# Patient Record
Sex: MALE | Race: WHITE | NOT HISPANIC OR LATINO | Employment: OTHER | URBAN - METROPOLITAN AREA
[De-identification: names, ages, dates, MRNs, and addresses within clinical notes are randomized per-mention and may not be internally consistent; named-entity substitution may affect disease eponyms.]

---

## 2021-01-20 ENCOUNTER — OFFICE VISIT (OUTPATIENT)
Dept: FAMILY MEDICINE CLINIC | Facility: HOSPITAL | Age: 31
End: 2021-01-20
Payer: OTHER GOVERNMENT

## 2021-01-20 VITALS
HEART RATE: 64 BPM | DIASTOLIC BLOOD PRESSURE: 84 MMHG | WEIGHT: 240.3 LBS | SYSTOLIC BLOOD PRESSURE: 122 MMHG | HEIGHT: 72 IN | BODY MASS INDEX: 32.55 KG/M2 | TEMPERATURE: 97 F

## 2021-01-20 DIAGNOSIS — G89.29 CHRONIC BACK PAIN, UNSPECIFIED BACK LOCATION, UNSPECIFIED BACK PAIN LATERALITY: Primary | ICD-10-CM

## 2021-01-20 DIAGNOSIS — M54.9 CHRONIC BACK PAIN, UNSPECIFIED BACK LOCATION, UNSPECIFIED BACK PAIN LATERALITY: Primary | ICD-10-CM

## 2021-01-20 DIAGNOSIS — F32.1 CURRENT MODERATE EPISODE OF MAJOR DEPRESSIVE DISORDER WITHOUT PRIOR EPISODE (HCC): ICD-10-CM

## 2021-01-20 DIAGNOSIS — M54.10 RADICULOPATHY, UNSPECIFIED SPINAL REGION: ICD-10-CM

## 2021-01-20 PROCEDURE — 99203 OFFICE O/P NEW LOW 30 MIN: CPT | Performed by: FAMILY MEDICINE

## 2021-01-20 RX ORDER — GABAPENTIN 300 MG/1
600 CAPSULE ORAL
Qty: 60 CAPSULE | Refills: 1 | Status: SHIPPED | OUTPATIENT
Start: 2021-01-20 | End: 2021-06-16 | Stop reason: SDUPTHER

## 2021-01-20 RX ORDER — ESCITALOPRAM OXALATE 10 MG/1
10 TABLET ORAL DAILY
Qty: 30 TABLET | Refills: 1 | Status: SHIPPED | OUTPATIENT
Start: 2021-01-20 | End: 2021-07-12

## 2021-01-20 RX ORDER — IBUPROFEN 600 MG/1
600 TABLET ORAL DAILY
Qty: 180 TABLET | Refills: 1 | Status: SHIPPED | OUTPATIENT
Start: 2021-01-20

## 2021-01-20 RX ORDER — IBUPROFEN 600 MG/1
TABLET ORAL DAILY
COMMUNITY
End: 2021-01-20 | Stop reason: SDUPTHER

## 2021-01-20 RX ORDER — GABAPENTIN 300 MG/1
300 CAPSULE ORAL DAILY
COMMUNITY
End: 2021-01-20 | Stop reason: SDUPTHER

## 2021-01-20 NOTE — PROGRESS NOTES
Assessment/Plan:      Problem List Items Addressed This Visit     None      Visit Diagnoses     Chronic back pain, unspecified back location, unspecified back pain laterality    -  Primary    Relevant Medications    gabapentin (NEURONTIN) 300 mg capsule    ibuprofen (MOTRIN) 600 mg tablet    Current moderate episode of major depressive disorder without prior episode (HCC)        Relevant Medications    escitalopram (LEXAPRO) 10 mg tablet    Radiculopathy, unspecified spinal region        Relevant Medications    gabapentin (NEURONTIN) 300 mg capsule    ibuprofen (MOTRIN) 600 mg tablet           Plan/Discussion:    Chronic back pain  Injury was in the thoracic back  About 3 year ago  Agree with gabapentin  Will try increase dose of 600 mg nightly  Monitor se/ar  He will call if there is any issue  He will be following up with apt with the [facility] regarding this  May continue with prn motrin 600 mg as needed  MDD  With some PTSD associated  Discussed treatment options  Will start lexapro 10 mg daily  Discussed se/ar  Will fu in 4 weeks  No si/hi  He will cotninue with therapy/counselling which he does once a week  He will also continue with scheduling to see psychiatry through the [facility]  Although not wanting any current intervention for his back or spine surgery  Will discuss perhaps pain mgt for spinal cord stimulator if this would be warranted for him  Depression Screening and Follow-up Plan: Patient's depression screening was positive with a PHQ-2 score of 6  Their PHQ-9 score was 25  Patient assessed for underlying major depression  Brief counseling provided and recommend additional follow-up/re-evaluation next office visit  Advised to try to get DOD records  Especially for potential future interventions  Subjective:   Chief Complaint   Patient presents with   Emaline Folds Establish Care    Numbness     Left leg         Patient ID: Tre Downing is a 35 y o  male     Pt here to establish care  35year old, recent medically retired from the [employer]  Was injured in [employer]  He was in a training activity and back was injured while carrying a Ruck sack  Reports damage was done in the thoracic spine  Has been seen in the past through pain mgt and had steroid injections with no benefit  Was seen by spine surgeon who did not recommend surgery  He has been on Gabapentin 300 mg nightly  This has helped him get through the night and has had some control of pain  Has continued to be restless at night due to pain  He has gotten sleepy when taking it during the day  Does have weakness of the arms and legs  Currently with some numbness on the left thigh area  MDD  Diagnosed with this a while back  Stemming from his injury  No prvious depressive sytmpoms prior to his injury  Reviewed phq-9  Has not been on medication but is willing to try  Has been going through counselling/therapy and this has had significant benefit  Currently seeing therapy once a week  prevous si/hi but no current si/hi and has not had them for a while  No longer with any firearms at home  The following portions of the patient's history were reviewed and updated as appropriate: allergies, current medications, past family history, past medical history, past social history, past surgical history and problem list     Review of Systems   Constitutional: Negative  Negative for activity change, appetite change, chills and diaphoresis  HENT: Negative for congestion and dental problem  Respiratory: Negative  Negative for apnea, chest tightness, shortness of breath and wheezing  Cardiovascular: Negative  Negative for chest pain, palpitations and leg swelling  Gastrointestinal: Negative  Negative for abdominal distention, abdominal pain, constipation, diarrhea and nausea  Genitourinary: Negative    Negative for difficulty urinating, dysuria and frequency  Objective:  Vitals:    01/20/21 0821   BP: 122/84   Pulse: 64   Temp: (!) 97 °F (36 1 °C)   Weight: 109 kg (240 lb 4 8 oz)   Height: 6' (1 829 m)     BP Readings from Last 6 Encounters:   01/20/21 122/84      Wt Readings from Last 6 Encounters:   01/20/21 109 kg (240 lb 4 8 oz)             Physical Exam  Vitals signs and nursing note reviewed  Constitutional:       Appearance: Normal appearance  He is normal weight  He is not ill-appearing  HENT:      Head: Normocephalic  Nose: Nose normal    Eyes:      Extraocular Movements: Extraocular movements intact  Pupils: Pupils are equal, round, and reactive to light  Cardiovascular:      Rate and Rhythm: Normal rate and regular rhythm  Pulmonary:      Effort: Pulmonary effort is normal       Breath sounds: Normal breath sounds  Abdominal:      Palpations: Abdomen is soft  Neurological:      Mental Status: He is alert and oriented to person, place, and time  Psychiatric:         Mood and Affect: Mood normal          Behavior: Behavior normal          Thought Content:  Thought content normal          Judgment: Judgment normal

## 2021-03-04 ENCOUNTER — OFFICE VISIT (OUTPATIENT)
Dept: FAMILY MEDICINE CLINIC | Facility: HOSPITAL | Age: 31
End: 2021-03-04
Payer: OTHER GOVERNMENT

## 2021-03-04 ENCOUNTER — HOSPITAL ENCOUNTER (OUTPATIENT)
Dept: RADIOLOGY | Facility: HOSPITAL | Age: 31
Discharge: HOME | End: 2021-03-04
Payer: OTHER GOVERNMENT

## 2021-03-04 VITALS
SYSTOLIC BLOOD PRESSURE: 122 MMHG | BODY MASS INDEX: 31.34 KG/M2 | HEART RATE: 82 BPM | WEIGHT: 231.4 LBS | DIASTOLIC BLOOD PRESSURE: 70 MMHG | TEMPERATURE: 97.8 F | HEIGHT: 72 IN

## 2021-03-04 DIAGNOSIS — M25.531 RIGHT WRIST PAIN: ICD-10-CM

## 2021-03-04 DIAGNOSIS — M79.644 PAIN OF RIGHT THUMB: ICD-10-CM
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
## 2021-03-04 DIAGNOSIS — M54.10 RADICULOPATHY, UNSPECIFIED SPINAL REGION: ICD-10-CM

## 2021-03-04 DIAGNOSIS — F32.1 CURRENT MODERATE EPISODE OF MAJOR DEPRESSIVE DISORDER WITHOUT PRIOR EPISODE (HCC): ICD-10-CM

## 2021-03-04 DIAGNOSIS — M79.644 PAIN OF RIGHT THUMB: Primary | ICD-10-CM

## 2021-03-04 PROCEDURE — 73110 X-RAY EXAM OF WRIST: CPT

## 2021-03-04 PROCEDURE — 73140 X-RAY EXAM OF FINGER(S): CPT

## 2021-03-04 PROCEDURE — 99214 OFFICE O/P EST MOD 30 MIN: CPT | Performed by: FAMILY MEDICINE

## 2021-03-04 NOTE — PROGRESS NOTES
Assessment/Plan:      Problem List Items Addressed This Visit     None      Visit Diagnoses     Pain of right thumb    -  Primary    Relevant Orders    XR thumb right first digit-min 2v    XR wrist 3+ vw right    Fall, initial encounter        Relevant Orders    XR thumb right first digit-min 2v    XR wrist 3+ vw right    Right wrist pain        Relevant Orders    XR wrist 3+ vw right    Current moderate episode of major depressive disorder without prior episode (HCC)        Radiculopathy, unspecified spinal region               Plan/Discussion:    1  Pain in the right thumb/wrist following a fall  Check plain films  Continue with thumb spica splint  2  MDD and low back pain with radiculopathy  Not wanting any medications at this time due to concerns for his sperm morphology  Awaiting retesting prior to reconsidering medications  Lexapro causing delyaed orgasm and erectile dysfunction as well  3  Radiculopathy  Hs been through PT and pain management  Discussed consideration to be evaluated for SCS  They will think about this  Subjective:   Chief Complaint   Patient presents with    Follow-up     4 week        Patient ID: Maranda Faustin is a 28 y o  male  Pt seen for fu  Since the lst visit he stopped both lexapro and neurotin  Noted lexapro causing ED and delayed ejaculation  For fertility workup he was told his sperm morphology is abnormal    Now would like to remain off of medication at least until retesting is done  Also fell the other day  Hurting his thumb and wrist    Pain to touch  The following portions of the patient's history were reviewed and updated as appropriate: allergies, current medications, past family history, past medical history, past social history, past surgical history and problem list     Review of Systems   Constitutional: Negative  Negative for activity change, appetite change, chills and diaphoresis     HENT: Negative for congestion and dental problem  Respiratory: Negative  Negative for apnea, chest tightness, shortness of breath and wheezing  Cardiovascular: Negative  Negative for chest pain, palpitations and leg swelling  Gastrointestinal: Negative  Negative for abdominal distention, abdominal pain, constipation, diarrhea and nausea  Genitourinary: Negative  Negative for difficulty urinating, dysuria and frequency  Objective:  Vitals:    03/04/21 0751   BP: 122/70   Pulse: 82   Temp: 97 8 °F (36 6 °C)   Weight: 105 kg (231 lb 6 4 oz)   Height: 6' (1 829 m)     BP Readings from Last 6 Encounters:   03/04/21 122/70   01/20/21 122/84      Wt Readings from Last 6 Encounters:   03/04/21 105 kg (231 lb 6 4 oz)   01/20/21 109 kg (240 lb 4 8 oz)             Physical Exam  Vitals signs and nursing note reviewed  Constitutional:       General: He is not in acute distress  Appearance: Normal appearance  He is well-developed  He is not ill-appearing  HENT:      Right Ear: External ear normal       Left Ear: Tympanic membrane and external ear normal       Nose: Nose normal  No congestion or rhinorrhea  Mouth/Throat:      Pharynx: No oropharyngeal exudate or posterior oropharyngeal erythema  Eyes:      Extraocular Movements: Extraocular movements intact  Pupils: Pupils are equal, round, and reactive to light  Cardiovascular:      Heart sounds: No murmur  No friction rub  No gallop  Pulmonary:      Effort: Pulmonary effort is normal  No respiratory distress  Breath sounds: No wheezing or rales  Chest:      Chest wall: No tenderness  Abdominal:      General: There is no distension  Palpations: There is no mass  Tenderness: There is no abdominal tenderness  There is no guarding or rebound  Musculoskeletal: Normal range of motion  Right hand: He exhibits tenderness  He exhibits normal range of motion, no bony tenderness, normal capillary refill, no deformity and no laceration  Left hand: Normal         Hands:       Comments: Tenderness on palpation of PIP of right thumb  ttp over the bse of thumb and radial aspect of wrist     Skin:     General: Skin is warm  Capillary Refill: Capillary refill takes less than 2 seconds  Neurological:      Mental Status: He is alert and oriented to person, place, and time     Psychiatric:         Mood and Affect: Mood normal          Behavior: Behavior normal

## 2021-04-06 ENCOUNTER — TELEPHONE (OUTPATIENT)
Dept: OTHER | Facility: OTHER | Age: 31
End: 2021-04-06

## 2021-04-21 ENCOUNTER — OFFICE VISIT (OUTPATIENT)
Dept: FAMILY MEDICINE CLINIC | Facility: HOSPITAL | Age: 31
End: 2021-04-21
Payer: OTHER GOVERNMENT

## 2021-04-21 ENCOUNTER — TELEPHONE (OUTPATIENT)
Dept: FAMILY MEDICINE CLINIC | Facility: HOSPITAL | Age: 31
End: 2021-04-21

## 2021-04-21 VITALS
TEMPERATURE: 97 F | HEART RATE: 78 BPM | HEIGHT: 72 IN | WEIGHT: 226.6 LBS | DIASTOLIC BLOOD PRESSURE: 80 MMHG | BODY MASS INDEX: 30.69 KG/M2 | SYSTOLIC BLOOD PRESSURE: 130 MMHG

## 2021-04-21 DIAGNOSIS — G89.29 CHRONIC BACK PAIN, UNSPECIFIED BACK LOCATION, UNSPECIFIED BACK PAIN LATERALITY: ICD-10-CM

## 2021-04-21 DIAGNOSIS — M54.9 CHRONIC BACK PAIN, UNSPECIFIED BACK LOCATION, UNSPECIFIED BACK PAIN LATERALITY: ICD-10-CM

## 2021-04-21 DIAGNOSIS — F32.1 CURRENT MODERATE EPISODE OF MAJOR DEPRESSIVE DISORDER WITHOUT PRIOR EPISODE (HCC): Primary | ICD-10-CM

## 2021-04-21 DIAGNOSIS — Z11.3 SCREENING FOR STD (SEXUALLY TRANSMITTED DISEASE): Primary | ICD-10-CM

## 2021-04-21 DIAGNOSIS — N46.9 INFERTILITY MALE: ICD-10-CM

## 2021-04-21 PROCEDURE — 99214 OFFICE O/P EST MOD 30 MIN: CPT | Performed by: FAMILY MEDICINE

## 2021-04-21 NOTE — PROGRESS NOTES
Assessment/Plan:      Problem List Items Addressed This Visit     None      Visit Diagnoses     Current moderate episode of major depressive disorder without prior episode ([facility] [address] )    -  Primary    Chronic back pain, unspecified back location, unspecified back pain laterality        Infertility male               Plan/Discussion:  Ongoing infertility treatments  Plan for IUI now  Reports having some abnormality with sperm morphology  Thus off of anymedications such as lexapro or gabapentin  MDD  No si/hi  Continue with therapy  No medications for now  Low back pain  Stable  Off of gabapentin  He will fu once IUI/infertility treatments are done and would like to review medications again  Subjective:   Chief Complaint   Patient presents with    Follow-up     Fertility issues and med check         Patient ID: Ebb Nic is a 28 y o  male  Pt here for fu  Ongoing treatment for infertility  Now will be trying IUI  Reports sperm morphology was off but still possible to have a child  Working with fertility physician  Mdd  Stable  Depression triggered by infertility issues  No si/hi  Ongoing therapy  Was recommended to do acupuncture  Advised regarding this  Low back pain  Off of gabapentin and is worse  Not wanting anything else  Using motrin prn for severe pain  The following portions of the patient's history were reviewed and updated as appropriate: allergies, current medications, past family history, past medical history, past social history, past surgical history and problem list     Review of Systems   Constitutional: Negative  Negative for activity change, appetite change, chills and diaphoresis  HENT: Negative for congestion and dental problem  Respiratory: Negative  Negative for apnea, chest tightness, shortness of breath and wheezing  Cardiovascular: Negative  Negative for chest pain, palpitations and leg swelling  Gastrointestinal: Negative  Negative for abdominal distention, abdominal pain, constipation, diarrhea and nausea  Genitourinary: Negative  Negative for difficulty urinating, dysuria and frequency  Objective:  Vitals:    04/21/21 0818   BP: 130/80   Pulse: 78   Temp: (!) 97 °F (36 1 °C)   Weight: 103 kg (226 lb 9 6 oz)   Height: 6' (1 829 m)     BP Readings from Last 6 Encounters:   04/21/21 130/80   03/04/21 122/70   01/20/21 122/84      Wt Readings from Last 6 Encounters:   04/21/21 103 kg (226 lb 9 6 oz)   03/04/21 105 kg (231 lb 6 4 oz)   01/20/21 109 kg (240 lb 4 8 oz)             Physical Exam  Vitals signs and nursing note reviewed  Neurological:      Mental Status: He is alert     Psychiatric:         Mood and Affect: Mood normal          Behavior: Behavior normal

## 2021-04-21 NOTE — TELEPHONE ENCOUNTER
Patient forgot to ask about std testing at appt today  The fertility doc order r-pr w/ reflex, HIV, Hep B, and HCV  He would like to be tested for any of STD's you could order for him  I advised that insurance may/may not cover this and that it may/may not be able to be added to the labs he had drawn today      pcb

## 2021-04-23 PROBLEM — Z11.3 SCREENING FOR STD (SEXUALLY TRANSMITTED DISEASE): Status: ACTIVE | Noted: 2021-04-23

## 2021-06-14 ENCOUNTER — TELEPHONE (OUTPATIENT)
Dept: FAMILY MEDICINE CLINIC | Facility: HOSPITAL | Age: 31
End: 2021-06-14

## 2021-06-14 NOTE — TELEPHONE ENCOUNTER
Pt dropped off medical records from the time he was medically retired from [employer], they are on [employer]  Also he stopped gabapentin for a time because him and his wife were trying to get pregnant but he is ready to go back on it and kind find the medicine  Could we re rx to [address] in [facility]?

## 2021-07-07 ENCOUNTER — OFFICE VISIT (OUTPATIENT)
Dept: FAMILY MEDICINE CLINIC | Facility: HOSPITAL | Age: 31
End: 2021-07-07
Payer: OTHER GOVERNMENT

## 2021-07-07 VITALS
TEMPERATURE: 98.4 F | HEART RATE: 78 BPM | WEIGHT: 227.2 LBS | HEIGHT: 72 IN | DIASTOLIC BLOOD PRESSURE: 86 MMHG | BODY MASS INDEX: 30.77 KG/M2 | SYSTOLIC BLOOD PRESSURE: 122 MMHG

## 2021-07-07 DIAGNOSIS — M54.9 CHRONIC BACK PAIN, UNSPECIFIED BACK LOCATION, UNSPECIFIED BACK PAIN LATERALITY: Primary | ICD-10-CM

## 2021-07-07 DIAGNOSIS — F32.1 CURRENT MODERATE EPISODE OF MAJOR DEPRESSIVE DISORDER WITHOUT PRIOR EPISODE (HCC): ICD-10-CM

## 2021-07-07 DIAGNOSIS — N46.9 INFERTILITY MALE: ICD-10-CM

## 2021-07-07 DIAGNOSIS — M54.10 RADICULOPATHY, UNSPECIFIED SPINAL REGION: ICD-10-CM

## 2021-07-07 DIAGNOSIS — G89.29 CHRONIC BACK PAIN, UNSPECIFIED BACK LOCATION, UNSPECIFIED BACK PAIN LATERALITY: Primary | ICD-10-CM

## 2021-07-07 PROCEDURE — 99214 OFFICE O/P EST MOD 30 MIN: CPT | Performed by: FAMILY MEDICINE

## 2021-07-12 NOTE — PROGRESS NOTES
Assessment/Plan:      Problem List Items Addressed This Visit     None      Visit Diagnoses     Chronic back pain, unspecified back location, unspecified back pain laterality    -  Primary    Radiculopathy, unspecified spinal region        Current moderate episode of major depressive disorder without prior episode [facility])        Infertility male               Plan/Discussion:  Chronic low back pain  Improved with going back on gabapentin  Not wanting an increase at this time  Currently getting Rx from the [place]    MDD  Stable  Would like to go back on medications but does not want anything new until infertility treatments are done  Advised therapy as well  Discussed seeking treatment through the [place]  He will fu in 3 months  Subjective:   Chief Complaint   Patient presents with    Follow-up     Gabapentin         Patient ID: Magalie Morfin is a 28 y o  male  Here for fu  Known chronic low back pain  Was restarted on gabapentin  Since the last visit he did get his rx from the [place]  Reports doing okay on current regimen      "keeps the edge off"  Mdd  Stable  No si/hi  Depressed mostly due to his low back pain and infertility  He and his wife are continuing efforts with pregnancy  She just underwent IUI , 7 days ago  He is not wanting any medications as may hamper efforts for pregnancy  The following portions of the patient's history were reviewed and updated as appropriate: allergies, current medications, past family history, past medical history, past social history, past surgical history and problem list     Review of Systems   Constitutional: Negative  Negative for activity change, appetite change, chills and diaphoresis  HENT: Negative for congestion and dental problem  Respiratory: Negative  Negative for apnea, chest tightness, shortness of breath and wheezing  Cardiovascular: Negative    Negative for chest pain, palpitations and leg swelling  Gastrointestinal: Negative  Negative for abdominal distention, abdominal pain, constipation, diarrhea and nausea  Genitourinary: Negative  Negative for difficulty urinating, dysuria and frequency  Objective:  Vitals:    07/07/21 1851   BP: 122/86   Pulse: 78   Temp: 98 4 °F (36 9 °C)   Weight: 103 kg (227 lb 3 2 oz)   Height: 6' (1 829 m)     BP Readings from Last 6 Encounters:   07/07/21 122/86   04/21/21 130/80   03/04/21 122/70   01/20/21 122/84      Wt Readings from Last 6 Encounters:   07/07/21 103 kg (227 lb 3 2 oz)   04/21/21 103 kg (226 lb 9 6 oz)   03/04/21 105 kg (231 lb 6 4 oz)   01/20/21 109 kg (240 lb 4 8 oz)             Physical Exam  Vitals and nursing note reviewed  Constitutional:       Appearance: Normal appearance  HENT:      Head: Normocephalic  Eyes:      Extraocular Movements: Extraocular movements intact  Pupils: Pupils are equal, round, and reactive to light  Skin:     Capillary Refill: Capillary refill takes less than 2 seconds  Neurological:      General: No focal deficit present  Mental Status: He is alert and oriented to person, place, and time     Psychiatric:         Mood and Affect: Mood normal          Behavior: Behavior normal

## 2021-08-04 ENCOUNTER — OFFICE VISIT (OUTPATIENT)
Dept: FAMILY MEDICINE CLINIC | Facility: HOSPITAL | Age: 31
End: 2021-08-04
Payer: OTHER GOVERNMENT

## 2021-08-04 VITALS
BODY MASS INDEX: 31.37 KG/M2 | SYSTOLIC BLOOD PRESSURE: 112 MMHG | DIASTOLIC BLOOD PRESSURE: 78 MMHG | WEIGHT: 231.6 LBS | HEART RATE: 76 BPM | HEIGHT: 72 IN | TEMPERATURE: 97.4 F

## 2021-08-04 DIAGNOSIS — F32.1 CURRENT MODERATE EPISODE OF MAJOR DEPRESSIVE DISORDER WITHOUT PRIOR EPISODE (HCC): Primary | ICD-10-CM

## 2021-08-04 DIAGNOSIS — G89.29 CHRONIC BACK PAIN, UNSPECIFIED BACK LOCATION, UNSPECIFIED BACK PAIN LATERALITY: ICD-10-CM

## 2021-08-04 DIAGNOSIS — M54.9 CHRONIC BACK PAIN, UNSPECIFIED BACK LOCATION, UNSPECIFIED BACK PAIN LATERALITY: ICD-10-CM

## 2021-08-04 DIAGNOSIS — D72.819 LEUKOPENIA, UNSPECIFIED TYPE: ICD-10-CM

## 2021-08-04 PROCEDURE — 99214 OFFICE O/P EST MOD 30 MIN: CPT | Performed by: FAMILY MEDICINE

## 2021-08-04 RX ORDER — DULOXETIN HYDROCHLORIDE 30 MG/1
30 CAPSULE, DELAYED RELEASE ORAL DAILY
Qty: 30 CAPSULE | Refills: 1 | Status: SHIPPED | OUTPATIENT
Start: 2021-08-04 | End: 2022-03-28

## 2021-08-05 NOTE — PROGRESS NOTES
Assessment/Plan:      Problem List Items Addressed This Visit     None      Visit Diagnoses     Current moderate episode of major depressive disorder without prior episode ([facility] [address] )    -  Primary    Relevant Medications    DULoxetine (CYMBALTA) 30 mg delayed release capsule    Chronic back pain, unspecified back location, unspecified back pain laterality        Relevant Medications    DULoxetine (CYMBALTA) 30 mg delayed release capsule    Leukopenia, unspecified type        Relevant Orders    CBC           Plan/Discussion:    Today he is wanting and willing to restart medication to help with MDD>   Continue with counselling  No si/hi  cymbalta 30 mg to be initiated  May help with chronic back pain as well  Fu in about 4 weeks  Discussed se/ar of medications  BMI Counseling: Body mass index is 31 41 kg/m²  The BMI is above normal  Nutrition recommendations include decreasing portion sizes, encouraging healthy choices of fruits and vegetables and moderation in carbohydrate intake  Exercise recommendations include exercising 3-5 times per week  Subjective:   Chief Complaint   Patient presents with    Follow-up        Patient ID: Julian Nichols is a 28 y o  male  Pt here for fu of mdd  Since the last visit he is now wanting to try an antidepressant  Is taking a break from fertility treatments  Was on lexapro but cause sexual dysfunction  cotninues with low back pain as well  Improved with use of neurontin  Today he is willing to use antidepressants and still cotninue with fertility treatments in the future  Discussed this should be used for at least 6-12 months to prevent recurrence  However he may need this for a longer time  Cannot use medication as needed               The following portions of the patient's history were reviewed and updated as appropriate: allergies, current medications, past family history, past medical history, past social history, past surgical history and problem list     Review of Systems   Constitutional: Negative  Negative for activity change, appetite change, chills and diaphoresis  HENT: Negative for congestion and dental problem  Respiratory: Negative  Negative for apnea, chest tightness, shortness of breath and wheezing  Cardiovascular: Negative  Negative for chest pain, palpitations and leg swelling  Gastrointestinal: Negative  Negative for abdominal distention, abdominal pain, constipation, diarrhea and nausea  Genitourinary: Negative  Negative for difficulty urinating, dysuria and frequency  Objective:  Vitals:    08/04/21 1657   BP: 112/78   Pulse: 76   Temp: (!) 97 4 °F (36 3 °C)   Weight: 105 kg (231 lb 9 6 oz)   Height: 6' (1 829 m)     BP Readings from Last 6 Encounters:   08/04/21 112/78   07/07/21 122/86   04/21/21 130/80   03/04/21 122/70   01/20/21 122/84      Wt Readings from Last 6 Encounters:   08/04/21 105 kg (231 lb 9 6 oz)   07/07/21 103 kg (227 lb 3 2 oz)   04/21/21 103 kg (226 lb 9 6 oz)   03/04/21 105 kg (231 lb 6 4 oz)   01/20/21 109 kg (240 lb 4 8 oz)             Physical Exam  Vitals and nursing note reviewed  Constitutional:       Appearance: Normal appearance  Neurological:      Mental Status: He is alert  Psychiatric:         Attention and Perception: Attention normal          Mood and Affect: Mood is depressed  Speech: Speech normal          Behavior: Behavior normal          Thought Content:  Thought content normal          Cognition and Memory: Cognition normal          Judgment: Judgment normal

## 2021-10-06 ENCOUNTER — OFFICE VISIT (OUTPATIENT)
Dept: FAMILY MEDICINE CLINIC | Facility: HOSPITAL | Age: 31
End: 2021-10-06
Payer: OTHER GOVERNMENT

## 2021-10-06 VITALS
BODY MASS INDEX: 31.97 KG/M2 | DIASTOLIC BLOOD PRESSURE: 80 MMHG | WEIGHT: 236 LBS | HEIGHT: 72 IN | SYSTOLIC BLOOD PRESSURE: 122 MMHG | TEMPERATURE: 98 F | HEART RATE: 79 BPM

## 2021-10-06 DIAGNOSIS — N52.2 DRUG-INDUCED ERECTILE DYSFUNCTION: ICD-10-CM

## 2021-10-06 DIAGNOSIS — F32.1 CURRENT MODERATE EPISODE OF MAJOR DEPRESSIVE DISORDER WITHOUT PRIOR EPISODE (HCC): Primary | ICD-10-CM

## 2021-10-06 PROCEDURE — 99214 OFFICE O/P EST MOD 30 MIN: CPT | Performed by: FAMILY MEDICINE

## 2021-10-06 RX ORDER — BUPROPION HYDROCHLORIDE 150 MG/1
150 TABLET ORAL EVERY MORNING
Qty: 30 TABLET | Refills: 1 | Status: SHIPPED | OUTPATIENT
Start: 2021-10-06 | End: 2022-03-28

## 2021-10-25 ENCOUNTER — TELEPHONE (OUTPATIENT)
Dept: FAMILY MEDICINE CLINIC | Facility: HOSPITAL | Age: 31
End: 2021-10-25

## 2021-10-25 DIAGNOSIS — U07.1 COVID-19 VIRUS INFECTION: Primary | ICD-10-CM

## 2021-10-25 RX ORDER — GUAIFENESIN AND CODEINE PHOSPHATE 100; 10 MG/5ML; MG/5ML
5 SOLUTION ORAL 4 TIMES DAILY PRN
Qty: 118 ML | Refills: 0 | Status: SHIPPED | OUTPATIENT
Start: 2021-10-25 | End: 2021-10-29 | Stop reason: SDUPTHER

## 2021-10-28 ENCOUNTER — TELEPHONE (OUTPATIENT)
Dept: FAMILY MEDICINE CLINIC | Facility: HOSPITAL | Age: 31
End: 2021-10-28

## 2021-10-29 ENCOUNTER — NURSE TRIAGE (OUTPATIENT)
Dept: OTHER | Facility: OTHER | Age: 31
End: 2021-10-29

## 2021-10-29 DIAGNOSIS — U07.1 COVID-19 VIRUS INFECTION: ICD-10-CM

## 2021-10-29 RX ORDER — GUAIFENESIN AND CODEINE PHOSPHATE 100; 10 MG/5ML; MG/5ML
5 SOLUTION ORAL 4 TIMES DAILY PRN
Qty: 118 ML | Refills: 0 | Status: SHIPPED | OUTPATIENT
Start: 2021-10-29 | End: 2021-10-29 | Stop reason: SDUPTHER

## 2021-11-04 ENCOUNTER — TELEPHONE (OUTPATIENT)
Dept: FAMILY MEDICINE CLINIC | Facility: HOSPITAL | Age: 31
End: 2021-11-04

## 2021-11-04 DIAGNOSIS — U07.1 COVID-19 VIRUS INFECTION: ICD-10-CM

## 2021-11-04 RX ORDER — GUAIFENESIN AND CODEINE PHOSPHATE 100; 10 MG/5ML; MG/5ML
5 SOLUTION ORAL 4 TIMES DAILY PRN
Qty: 180 ML | Refills: 0 | Status: SHIPPED | OUTPATIENT
Start: 2021-11-04 | End: 2021-11-04 | Stop reason: SDUPTHER

## 2021-11-04 RX ORDER — GUAIFENESIN AND CODEINE PHOSPHATE 100; 10 MG/5ML; MG/5ML
5 SOLUTION ORAL 4 TIMES DAILY PRN
Qty: 180 ML | Refills: 0 | Status: SHIPPED | OUTPATIENT
Start: 2021-11-04 | End: 2021-11-11

## 2021-11-17 DIAGNOSIS — M54.9 CHRONIC BACK PAIN, UNSPECIFIED BACK LOCATION, UNSPECIFIED BACK PAIN LATERALITY: ICD-10-CM

## 2021-11-17 DIAGNOSIS — G89.29 CHRONIC BACK PAIN, UNSPECIFIED BACK LOCATION, UNSPECIFIED BACK PAIN LATERALITY: ICD-10-CM

## 2021-11-17 DIAGNOSIS — M54.10 RADICULOPATHY, UNSPECIFIED SPINAL REGION: ICD-10-CM

## 2021-11-17 RX ORDER — GABAPENTIN 300 MG/1
600 CAPSULE ORAL
Qty: 60 CAPSULE | Refills: 1 | Status: SHIPPED | OUTPATIENT
Start: 2021-11-17 | End: 2022-01-31 | Stop reason: SDUPTHER

## 2022-01-12 ENCOUNTER — OFFICE VISIT (OUTPATIENT)
Dept: FAMILY MEDICINE CLINIC | Facility: HOSPITAL | Age: 32
End: 2022-01-12
Payer: OTHER GOVERNMENT

## 2022-01-12 VITALS
HEIGHT: 72 IN | BODY MASS INDEX: 30.75 KG/M2 | HEART RATE: 74 BPM | SYSTOLIC BLOOD PRESSURE: 124 MMHG | TEMPERATURE: 96.7 F | DIASTOLIC BLOOD PRESSURE: 84 MMHG | WEIGHT: 227 LBS

## 2022-01-12 DIAGNOSIS — F32.1 CURRENT MODERATE EPISODE OF MAJOR DEPRESSIVE DISORDER WITHOUT PRIOR EPISODE (HCC): Primary | ICD-10-CM

## 2022-01-12 DIAGNOSIS — M54.40 CHRONIC BILATERAL LOW BACK PAIN WITH SCIATICA, SCIATICA LATERALITY UNSPECIFIED: ICD-10-CM

## 2022-01-12 DIAGNOSIS — G89.29 CHRONIC BILATERAL LOW BACK PAIN WITH SCIATICA, SCIATICA LATERALITY UNSPECIFIED: ICD-10-CM

## 2022-01-12 PROBLEM — M54.41 CHRONIC BILATERAL LOW BACK PAIN WITH SCIATICA: Status: ACTIVE | Noted: 2022-01-12

## 2022-01-12 PROBLEM — Z11.3 SCREENING FOR STD (SEXUALLY TRANSMITTED DISEASE): Status: RESOLVED | Noted: 2021-04-23 | Resolved: 2022-01-12

## 2022-01-12 PROBLEM — M54.42 CHRONIC BILATERAL LOW BACK PAIN WITH SCIATICA: Status: ACTIVE | Noted: 2022-01-12

## 2022-01-12 PROCEDURE — 99213 OFFICE O/P EST LOW 20 MIN: CPT | Performed by: FAMILY MEDICINE

## 2022-01-12 NOTE — PROGRESS NOTES
Assessment/Plan:      Problem List Items Addressed This Visit        Nervous and Auditory    Chronic bilateral low back pain with sciatica       Other    Current moderate episode of major depressive disorder without prior episode ([address] ) - Primary           Plan/Discussion:  Pt had se/ar from lexapro and cymbalta  wellbutrin was ineffective  Discussed he should see Psychiatry at this time  Advised psychology as well  He has this setup with the [place]  Rib contusion s/p fall/ice  Supportive treatment  Subjective:   Chief Complaint   Patient presents with    Follow-up    Back Pain     Vernard Fix on the ice         Patient ID: Alexa Suazo is a 28 y o  male  Pt seen for fu  Last visit wellbutrin was tried  This has been ineffective  No improvement at all  On review he has been on lexapro and cymbalta and although may have had some benefit it was associated with ED  The following portions of the patient's history were reviewed and updated as appropriate: allergies, current medications, past family history, past medical history, past social history, past surgical history and problem list     Review of Systems   Constitutional: Negative  Negative for activity change, appetite change, chills and diaphoresis  HENT: Negative for congestion and dental problem  Respiratory: Negative  Negative for apnea, chest tightness, shortness of breath and wheezing  Cardiovascular: Negative  Negative for chest pain, palpitations and leg swelling  Gastrointestinal: Negative  Negative for abdominal distention, abdominal pain, constipation, diarrhea and nausea  Genitourinary: Negative  Negative for difficulty urinating, dysuria and frequency           Objective:  Vitals:    01/12/22 1739   BP: 124/84   Pulse: 74   Temp: (!) 96 7 °F (35 9 °C)   Weight: 103 kg (227 lb)   Height: 6' (1 829 m)     BP Readings from Last 6 Encounters:   01/12/22 124/84   10/06/21 122/80   08/04/21 112/78 07/07/21 122/86   04/21/21 130/80   03/04/21 122/70      Wt Readings from Last 6 Encounters:   01/12/22 103 kg (227 lb)   10/06/21 107 kg (236 lb)   08/04/21 105 kg (231 lb 9 6 oz)   07/07/21 103 kg (227 lb 3 2 oz)   04/21/21 103 kg (226 lb 9 6 oz)   03/04/21 105 kg (231 lb 6 4 oz)              Physical Exam  Vitals and nursing note reviewed  Constitutional:       Appearance: Normal appearance  Neurological:      Mental Status: He is alert  Psychiatric:         Attention and Perception: Attention and perception normal          Mood and Affect: Mood is depressed  Speech: Speech normal          Behavior: Behavior normal          Thought Content:  Thought content normal          Cognition and Memory: Cognition normal          Judgment: Judgment normal

## 2022-01-31 DIAGNOSIS — M54.10 RADICULOPATHY, UNSPECIFIED SPINAL REGION: ICD-10-CM

## 2022-01-31 DIAGNOSIS — G89.29 CHRONIC BACK PAIN, UNSPECIFIED BACK LOCATION, UNSPECIFIED BACK PAIN LATERALITY: ICD-10-CM

## 2022-01-31 DIAGNOSIS — M54.9 CHRONIC BACK PAIN, UNSPECIFIED BACK LOCATION, UNSPECIFIED BACK PAIN LATERALITY: ICD-10-CM

## 2022-01-31 RX ORDER — GABAPENTIN 300 MG/1
600 CAPSULE ORAL
Qty: 60 CAPSULE | Refills: 1 | Status: SHIPPED | OUTPATIENT
Start: 2022-01-31

## 2022-03-08 ENCOUNTER — TELEPHONE (OUTPATIENT)
Dept: PSYCHIATRY | Facility: CLINIC | Age: 32
End: 2022-03-08

## 2022-03-08 ENCOUNTER — TELEPHONE (OUTPATIENT)
Dept: FAMILY MEDICINE CLINIC | Facility: HOSPITAL | Age: 32
End: 2022-03-08

## 2022-03-08 NOTE — TELEPHONE ENCOUNTER
Patient need psychiatry referral - everyone he has tried will not see him since he lives in [facility] [address] - patient has  insurance - advised patient to contact ins co - is there anyone dr Howard Sherman could specifically recommend?

## 2022-03-08 NOTE — TELEPHONE ENCOUNTER
PT called and stated he is a disabled , and is current applying for a program but needs a form signed but provider are not signing it for reason, PT is located in [facility] and informed pt we do not take that county, pt was given country resources

## 2022-03-09 NOTE — TELEPHONE ENCOUNTER
Please call  May have to go to the [facility]  Please give him the number to [facility] psychiatry who I do think takes

## 2022-03-21 ENCOUNTER — TELEPHONE (OUTPATIENT)
Dept: PSYCHIATRY | Facility: CLINIC | Age: 32
End: 2022-03-21

## 2022-03-21 NOTE — TELEPHONE ENCOUNTER
Pt wants to get add to med mgmt list  But he also needed paper work to see if that is something we do before he waits on the wait list

## 2022-03-22 ENCOUNTER — TELEPHONE (OUTPATIENT)
Dept: PSYCHIATRY | Facility: CLINIC | Age: 32
End: 2022-03-22

## 2022-03-22 NOTE — TELEPHONE ENCOUNTER
Called pt back to let him know we wouldn't know until the day of the appointment if they can fill out the paper work or not   But I did add him to the wait list  As of 3/21/22

## 2022-03-28 ENCOUNTER — OFFICE VISIT (OUTPATIENT)
Dept: FAMILY MEDICINE CLINIC | Facility: HOSPITAL | Age: 32
End: 2022-03-28
Payer: OTHER GOVERNMENT

## 2022-03-28 VITALS
SYSTOLIC BLOOD PRESSURE: 122 MMHG | DIASTOLIC BLOOD PRESSURE: 72 MMHG | TEMPERATURE: 97.9 F | HEART RATE: 75 BPM | WEIGHT: 235.6 LBS | BODY MASS INDEX: 31.91 KG/M2 | HEIGHT: 72 IN

## 2022-03-28 DIAGNOSIS — Z12.83 SKIN CANCER SCREENING: ICD-10-CM

## 2022-03-28 DIAGNOSIS — D22.9 NEVUS: Primary | ICD-10-CM

## 2022-03-28 PROCEDURE — 99213 OFFICE O/P EST LOW 20 MIN: CPT | Performed by: FAMILY MEDICINE

## 2022-03-28 RX ORDER — MIRTAZAPINE 15 MG/1
TABLET, FILM COATED ORAL
COMMUNITY
Start: 2022-02-25

## 2022-03-28 RX ORDER — NALTREXONE HYDROCHLORIDE 50 MG/1
TABLET, FILM COATED ORAL
COMMUNITY
Start: 2022-03-22

## 2022-03-28 NOTE — PROGRESS NOTES
Assessment/Plan:      Problem List Items Addressed This Visit     None      Visit Diagnoses     Nevus    -  Primary    Relevant Orders    Ambulatory Referral to Dermatology    Skin cancer screening        Relevant Orders    Ambulatory Referral to Dermatology           Plan/Discussion:  Appears to have a compound nevus that may have been scratched or irritated  Multiple other nevi seen on the back  I think he would benefit from skin cancer screening and thus referral to dermatology  Subjective:   Chief Complaint   Patient presents with    Nevus     On back         Patient ID: Guillermo Bansal is a 33 y o  male  Pt was told he had a mole on the back  This may have been aggravated by recent cupping treatment  Wife noted this may have increased in size from previous  The following portions of the patient's history were reviewed and updated as appropriate: allergies, current medications, past family history, past medical history, past social history, past surgical history and problem list     Review of Systems      Objective:  Vitals:    03/28/22 0905   BP: 122/72   Pulse: 75   Temp: 97 9 °F (36 6 °C)   Weight: 107 kg (235 lb 9 6 oz)   Height: 6' (1 829 m)     BP Readings from Last 6 Encounters:   03/28/22 122/72   01/12/22 124/84   10/06/21 122/80   08/04/21 112/78   07/07/21 122/86   04/21/21 130/80      Wt Readings from Last 6 Encounters:   03/28/22 107 kg (235 lb 9 6 oz)   01/12/22 103 kg (227 lb)   10/06/21 107 kg (236 lb)   08/04/21 105 kg (231 lb 9 6 oz)   07/07/21 103 kg (227 lb 3 2 oz)   04/21/21 103 kg (226 lb 9 6 oz)             Physical Exam  Vitals and nursing note reviewed  Constitutional:       Appearance: Normal appearance  Skin:     Comments: 8 mm raised nevus  Smooth borders, light brown in colore  In the center the skin is open  No purulence, no erythema  Multiple other benign appearing nevi scattered over his back      Neurological:      Mental Status: He is alert

## 2022-09-07 ENCOUNTER — OFFICE VISIT (OUTPATIENT)
Dept: FAMILY MEDICINE CLINIC | Facility: HOSPITAL | Age: 32
End: 2022-09-07
Payer: OTHER GOVERNMENT

## 2022-09-07 VITALS
SYSTOLIC BLOOD PRESSURE: 128 MMHG | BODY MASS INDEX: 29.8 KG/M2 | DIASTOLIC BLOOD PRESSURE: 96 MMHG | TEMPERATURE: 96.4 F | HEIGHT: 72 IN | WEIGHT: 220 LBS | HEART RATE: 74 BPM

## 2022-09-07 DIAGNOSIS — G89.29 CHRONIC BACK PAIN, UNSPECIFIED BACK LOCATION, UNSPECIFIED BACK PAIN LATERALITY: ICD-10-CM

## 2022-09-07 DIAGNOSIS — F32.1 CURRENT MODERATE EPISODE OF MAJOR DEPRESSIVE DISORDER WITHOUT PRIOR EPISODE (HCC): ICD-10-CM

## 2022-09-07 DIAGNOSIS — M54.10 RADICULOPATHY, UNSPECIFIED SPINAL REGION: ICD-10-CM

## 2022-09-07 DIAGNOSIS — M54.9 CHRONIC BACK PAIN, UNSPECIFIED BACK LOCATION, UNSPECIFIED BACK PAIN LATERALITY: ICD-10-CM

## 2022-09-07 DIAGNOSIS — R55 SYNCOPE, UNSPECIFIED SYNCOPE TYPE: Primary | ICD-10-CM

## 2022-09-07 DIAGNOSIS — F41.9 ANXIETY: ICD-10-CM

## 2022-09-07 PROCEDURE — 99214 OFFICE O/P EST MOD 30 MIN: CPT | Performed by: FAMILY MEDICINE

## 2022-09-07 RX ORDER — MIRTAZAPINE 30 MG/1
TABLET, FILM COATED ORAL
COMMUNITY
Start: 2022-08-24

## 2022-09-07 RX ORDER — IBUPROFEN 600 MG/1
600 TABLET ORAL EVERY 6 HOURS PRN
Qty: 90 TABLET | Refills: 1 | Status: SHIPPED | OUTPATIENT
Start: 2022-09-07

## 2022-09-07 RX ORDER — GABAPENTIN 300 MG/1
600 CAPSULE ORAL
Qty: 60 CAPSULE | Refills: 2 | Status: SHIPPED | OUTPATIENT
Start: 2022-09-07
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
## 2022-11-03 ENCOUNTER — TELEPHONE (OUTPATIENT)
Dept: FAMILY MEDICINE CLINIC | Facility: HOSPITAL | Age: 32
End: 2022-11-03

## 2022-11-03 NOTE — TELEPHONE ENCOUNTER
Please call  No, continue with otc guafflucyin  I would rather he not be on codeine for coughing  May combine with promethazine  Take zinc, vitamin C  Increase fluids

## 2023-02-09 DIAGNOSIS — M54.9 CHRONIC BACK PAIN, UNSPECIFIED BACK LOCATION, UNSPECIFIED BACK PAIN LATERALITY: ICD-10-CM

## 2023-02-09 DIAGNOSIS — M54.10 RADICULOPATHY, UNSPECIFIED SPINAL REGION: ICD-10-CM

## 2023-02-09 DIAGNOSIS — G89.29 CHRONIC BACK PAIN, UNSPECIFIED BACK LOCATION, UNSPECIFIED BACK PAIN LATERALITY: ICD-10-CM

## 2023-02-10 RX ORDER — GABAPENTIN 300 MG/1
600 CAPSULE ORAL
Qty: 60 CAPSULE | Refills: 2 | Status: SHIPPED | OUTPATIENT
Start: 2023-02-10

## 2023-02-10 RX ORDER — IBUPROFEN 600 MG/1
600 TABLET ORAL EVERY 6 HOURS PRN
Qty: 90 TABLET | Refills: 1 | Status: SHIPPED | OUTPATIENT
Start: 2023-02-10

## 2023-03-23 DIAGNOSIS — M54.9 CHRONIC BACK PAIN, UNSPECIFIED BACK LOCATION, UNSPECIFIED BACK PAIN LATERALITY: ICD-10-CM

## 2023-03-23 DIAGNOSIS — M54.10 RADICULOPATHY, UNSPECIFIED SPINAL REGION: ICD-10-CM

## 2023-03-23 DIAGNOSIS — G89.29 CHRONIC BACK PAIN, UNSPECIFIED BACK LOCATION, UNSPECIFIED BACK PAIN LATERALITY: ICD-10-CM

## 2023-03-23 RX ORDER — IBUPROFEN 600 MG/1
600 TABLET ORAL EVERY 6 HOURS PRN
Qty: 90 TABLET | Refills: 1 | Status: SHIPPED | OUTPATIENT
Start: 2023-03-23

## 2023-06-02 DIAGNOSIS — M54.10 RADICULOPATHY, UNSPECIFIED SPINAL REGION: ICD-10-CM

## 2023-06-02 DIAGNOSIS — M54.9 CHRONIC BACK PAIN, UNSPECIFIED BACK LOCATION, UNSPECIFIED BACK PAIN LATERALITY: ICD-10-CM

## 2023-06-02 DIAGNOSIS — G89.29 CHRONIC BACK PAIN, UNSPECIFIED BACK LOCATION, UNSPECIFIED BACK PAIN LATERALITY: ICD-10-CM

## 2023-06-02 RX ORDER — IBUPROFEN 600 MG/1
600 TABLET ORAL EVERY 6 HOURS PRN
Qty: 90 TABLET | Refills: 1 | Status: SHIPPED | OUTPATIENT
Start: 2023-06-02

## 2023-08-01 ENCOUNTER — TELEPHONE (OUTPATIENT)
Dept: FAMILY MEDICINE CLINIC | Facility: HOSPITAL | Age: 33
End: 2023-08-01

## 2023-08-01 DIAGNOSIS — M54.9 CHRONIC BACK PAIN, UNSPECIFIED BACK LOCATION, UNSPECIFIED BACK PAIN LATERALITY: ICD-10-CM

## 2023-08-01 DIAGNOSIS — M54.10 RADICULOPATHY, UNSPECIFIED SPINAL REGION: ICD-10-CM

## 2023-08-01 DIAGNOSIS — G89.29 CHRONIC BACK PAIN, UNSPECIFIED BACK LOCATION, UNSPECIFIED BACK PAIN LATERALITY: ICD-10-CM

## 2023-08-01 NOTE — TELEPHONE ENCOUNTER
Pt called asking if he had any refills left on his gabapentin. He lost his bottle and didn't know. I checked rx and it was sent on 2/10/23 for #60 with 2 ref. He should be out of by now. I checked his hx and last in 9/7/22 with dr Aarti Obrien. He said he might need to be seen to get a refill. thx girls!

## 2023-08-02 DIAGNOSIS — M54.9 CHRONIC BACK PAIN, UNSPECIFIED BACK LOCATION, UNSPECIFIED BACK PAIN LATERALITY: ICD-10-CM

## 2023-08-02 DIAGNOSIS — M54.10 RADICULOPATHY, UNSPECIFIED SPINAL REGION: ICD-10-CM

## 2023-08-02 DIAGNOSIS — G89.29 CHRONIC BACK PAIN, UNSPECIFIED BACK LOCATION, UNSPECIFIED BACK PAIN LATERALITY: ICD-10-CM

## 2023-08-02 RX ORDER — GABAPENTIN 300 MG/1
CAPSULE ORAL
Qty: 60 CAPSULE | Refills: 2 | OUTPATIENT
Start: 2023-08-02

## 2023-08-02 RX ORDER — GABAPENTIN 300 MG/1
600 CAPSULE ORAL
Qty: 60 CAPSULE | Refills: 2 | Status: SHIPPED | OUTPATIENT
Start: 2023-08-02

## 2023-08-21 DIAGNOSIS — M54.9 CHRONIC BACK PAIN, UNSPECIFIED BACK LOCATION, UNSPECIFIED BACK PAIN LATERALITY: ICD-10-CM

## 2023-08-21 DIAGNOSIS — G89.29 CHRONIC BACK PAIN, UNSPECIFIED BACK LOCATION, UNSPECIFIED BACK PAIN LATERALITY: ICD-10-CM

## 2023-08-21 DIAGNOSIS — M54.10 RADICULOPATHY, UNSPECIFIED SPINAL REGION: ICD-10-CM

## 2023-08-21 RX ORDER — IBUPROFEN 600 MG/1
600 TABLET ORAL EVERY 6 HOURS PRN
Qty: 90 TABLET | Refills: 1 | Status: SHIPPED | OUTPATIENT
Start: 2023-08-21

## 2023-09-15 ENCOUNTER — TELEPHONE (OUTPATIENT)
Dept: PSYCHIATRY | Facility: CLINIC | Age: 33
End: 2023-09-15

## 2023-09-15 NOTE — LETTER
Dear Ramón Amaro : We are contacting you because your name is currently included on the [address] [place] [address] wait-list for Talk Therapy and/or Medication Management. (Please [place] which services are needed)     In our efforts to provide the highest quality care, [facility] has begun the process of upgrading our behavioral health systems to increase efficiency and expedite delivery of services. As part of this process, we ask you to please confirm your continued interest in the services above. If you are no longer interested or in need, please gloria “No” in the area below. If you are still interested and in need, please gloria “Yes” and provide your most current demographic and insurance information within 15 days. If we do not receive confirmation from you by 2023 your information will not be included in the system upgrade and your place on the waitlist will be lost.     Thank you in advance for your patience and understanding and we apologize for any inconvenience this may cause. Patient Name and :    Still in need of services: Yes or No     Current Address:     Phone#:     Best time to receive a call: Insurance Carrier:      Policy/ID#: Group#: Insurance Services Phone#:      What is your current presenting problem? Open to virtual talk therapy: Yes or No      We will call you to do an Intake when an appointment becomes available. You can send this information back to us in any of the ways below:    Email: [email or web address]. Alexus Monroe  Fax#:  [phone]  Mail:   [address] [place] [address]             [address] [facility]              Celestina Hazel, [address] [place] [address]

## 2023-11-06 ENCOUNTER — OFFICE VISIT (OUTPATIENT)
Dept: FAMILY MEDICINE CLINIC | Facility: HOSPITAL | Age: 33
End: 2023-11-06
Payer: OTHER GOVERNMENT

## 2023-11-06 VITALS
TEMPERATURE: 96.8 F | DIASTOLIC BLOOD PRESSURE: 82 MMHG | WEIGHT: 235.4 LBS | HEIGHT: 72 IN | SYSTOLIC BLOOD PRESSURE: 118 MMHG | HEART RATE: 80 BPM | BODY MASS INDEX: 31.89 KG/M2

## 2023-11-06 DIAGNOSIS — G89.29 CHRONIC BILATERAL LOW BACK PAIN WITH SCIATICA, SCIATICA LATERALITY UNSPECIFIED: ICD-10-CM

## 2023-11-06 DIAGNOSIS — M54.40 CHRONIC BILATERAL LOW BACK PAIN WITH SCIATICA, SCIATICA LATERALITY UNSPECIFIED: ICD-10-CM

## 2023-11-06 DIAGNOSIS — F32.1 CURRENT MODERATE EPISODE OF MAJOR DEPRESSIVE DISORDER WITHOUT PRIOR EPISODE (HCC): ICD-10-CM

## 2023-11-06 DIAGNOSIS — R25.2 CRAMP OF MUSCLE OF RIGHT UPPER EXTREMITY: Primary | ICD-10-CM

## 2023-11-06 PROCEDURE — 99214 OFFICE O/P EST MOD 30 MIN: CPT | Performed by: FAMILY MEDICINE

## 2023-11-06 NOTE — PROGRESS NOTES
Name: Yariel Carter      : 1990      MRN: [id number]  Encounter Provider: Glenn Garcia MD  Encounter Date: 2023   Encounter department: [address]     1. Cramp of muscle of right upper extremity  -     Comprehensive metabolic panel; Future  -     Magnesium; Future  -     TSH, 3rd generation with Free T4 reflex; Future  -     CK; Future  -     Comprehensive metabolic panel  -     Magnesium  -     TSH, 3rd generation with Free T4 reflex  -     CK    2. Current moderate episode of major depressive disorder without prior episode ([address])    3. Chronic bilateral low back pain with sciatica, sciatica laterality unspecified       Check labs as outlined. May try voltaren gel for pain topically. May try otc magnesium as well. Advised stretching and continued exercise. Mdd. Stable. Ongoing treatment with psych. Monitor as meds may cause cramping. Low back pain. Does not appear to be related to muscle cramping. Stable at this time. Subjective        Brendan Jaime is here for intermittent cramping in the right let. Does walk about 1/2 mile a day. Has not treid stretching or range of motion. Wants to try otc magnsium but was hesitant without physican advice. No trauma. No redness, no swelling. No injury. Review of Systems   Constitutional:  Negative for activity change and appetite change.        Current Outpatient Medications on File Prior to Visit   Medication Sig   • BUPROPION HCL PO Take by mouth   • gabapentin (NEURONTIN) 300 mg capsule Take 2 capsules (600 mg total) by mouth daily at bedtime   • ibuprofen (MOTRIN) 600 mg tablet TAKE 1 TABLET (600 MG TOTAL) BY MOUTH EVERY 6 (SIX) HOURS AS NEEDED FOR MILD PAIN   • [DISCONTINUED] mirtazapine (REMERON) 30 mg tablet  (Patient not taking: Reported on 2023)   • [DISCONTINUED] naltrexone (REVIA) 50 mg tablet  (Patient not taking: Reported on 2023)       Objective /82   Pulse 80   Temp (!) 96.8 °F (36 °C)   Ht 6' (1.829 m)   Wt 107 kg (235 lb 6.4 oz)   BMI 31.93 kg/m²     Physical Exam  Vitals and nursing note reviewed. Constitutional:       Appearance: Normal appearance. Cardiovascular:      Pulses:           Popliteal pulses are 2+ on the right side and 2+ on the left side. Dorsalis pedis pulses are 2+ on the right side and 2+ on the left side. Posterior tibial pulses are 2+ on the right side and 2+ on the left side. Musculoskeletal:      Right lower leg: No swelling, deformity, lacerations, tenderness or bony tenderness. No edema. Left lower leg: No edema. Neurological:      Mental Status: He is alert. Psychiatric:         Attention and Perception: Attention and perception normal.         Mood and Affect: Mood and affect normal.         Speech: Speech normal.         Behavior: Behavior normal.         Thought Content:  Thought content normal.         Cognition and Memory: Cognition normal.         Judgment: Judgment normal.       Angela Ornelas MD

## 2024-08-13 ENCOUNTER — OFFICE VISIT (OUTPATIENT)
Dept: FAMILY MEDICINE CLINIC | Facility: HOSPITAL | Age: 34
End: 2024-08-13
Payer: OTHER GOVERNMENT

## 2024-08-13 VITALS
HEIGHT: 72 IN | BODY MASS INDEX: 34.05 KG/M2 | HEART RATE: 67 BPM | SYSTOLIC BLOOD PRESSURE: 132 MMHG | OXYGEN SATURATION: 99 % | TEMPERATURE: 97 F | WEIGHT: 251.4 LBS | DIASTOLIC BLOOD PRESSURE: 80 MMHG

## 2024-08-13 DIAGNOSIS — Q55.20 SCROTAL ANOMALY: ICD-10-CM

## 2024-08-13 DIAGNOSIS — Z12.83 SKIN CANCER SCREENING: ICD-10-CM

## 2024-08-13 DIAGNOSIS — L60.0 INGROWN TOENAIL OF RIGHT FOOT: Primary | ICD-10-CM

## 2024-08-13 PROCEDURE — 99214 OFFICE O/P EST MOD 30 MIN: CPT | Performed by: INTERNAL MEDICINE

## 2024-08-13 NOTE — PROGRESS NOTES
Ambulatory Visit  Name: Newton Mason      : 1990      MRN: [id number]  Encounter Provider: Paulina Guerra DO  Encounter Date: 2024   Encounter department: [facility] [address]     Assessment & Plan   1. Ingrown toenail of right foot  Comments:  Chronic in nature w/o acute inflammation/infection, advised to avoid narrow shoes/cutting nails to short/cutting nails at an angle, advised eval with podiatry - referral given  Orders:  -     Ambulatory Referral to Podiatry; Future  2. Skin cancer screening  Comments:  ABCD's reviewed, advised to clarify what type of skin CA Dad had, referral to Derm placed  Orders:  -     Ambulatory Referral to Dermatology; Future  3. Scrotal anomaly  Comments:  Reassured pt that pain resolved quicky and had no lump/mass associated - likely strain, reassured veins he was concerned of appeared nml, if pain reoccurs would check US and refer to Uro      Advised to make appt for PE with PCP         History of Present Illness     HPI Pt here for an acute visit    Pt concerned over poss ingrown toe nail of R foot. He states the pain is intermittent and gets red and then he pushes on it and it has drainage. He states symptoms are currently not present but it seems to be occurring every 3-4 mos or so for the past few years.  He does not follow with podiatry.     Pt with concern over a poss mole on his back.  He states the lesions has been present for an unknown time. He was washing back one day and felt pain.  His wife looked and said he had a blackhead and a mole next to it. He is not sure if the mole changed.      Was in the shower and feel like he strained his groin. He states symptoms were there for a day or two but then resolved. His wife looked at the area and thought he had more prominent veins on the scrotum    Review of Systems   Constitutional:  Negative for chills and fever.   Respiratory:  Negative for cough and shortness of breath.     Cardiovascular:  Negative for chest pain and palpitations.   Gastrointestinal:  Negative for abdominal pain, diarrhea, nausea and vomiting.   Genitourinary:  Negative for dysuria, genital sores, penile discharge, penile pain and testicular pain.   Musculoskeletal:  Positive for back pain and gait problem.   Skin:  Positive for wound.   Neurological:  Negative for dizziness and headaches.   Psychiatric/Behavioral:  The patient is nervous/anxious.        Objective     /80   Pulse 67   Temp (!) 97 °F (36.1 °C)   Ht 6' (1.829 m)   Wt 114 kg (251 lb 6.4 oz)   SpO2 99%   BMI 34.10 kg/m²     Physical Exam  Vitals and nursing note reviewed.   Constitutional:       General: He is not in acute distress.     Appearance: He is well-developed. He is not ill-appearing.   HENT:      Head: Normocephalic and atraumatic.      Right Ear: External ear normal.      Left Ear: External ear normal.   Eyes:      General:         Right eye: No discharge.         Left eye: No discharge.      Conjunctiva/sclera: Conjunctivae normal.   Neck:      Trachea: No tracheal deviation.   Pulmonary:      Effort: Pulmonary effort is normal. No respiratory distress.   Genitourinary:     Penis: Normal.       Testes: Normal.      Comments: No testicular masses/lumps/pain, nml appearing veins on scrotum w/o ulcerations  Skin:     General: Skin is warm and dry.      Coloration: Skin is not pale.      Findings: No erythema or rash.      Comments: Back with mult small nevus scattered   Neurological:      General: No focal deficit present.      Mental Status: He is alert.      Motor: No abnormal muscle tone.      Gait: Gait abnormal.   Psychiatric:         Behavior: Behavior normal.         Thought Content: Thought content normal.       Administrative Statements

## 2024-10-24 ENCOUNTER — OFFICE VISIT (OUTPATIENT)
Dept: FAMILY MEDICINE CLINIC | Facility: HOSPITAL | Age: 34
End: 2024-10-24
Payer: OTHER GOVERNMENT

## 2024-10-24 VITALS
HEART RATE: 94 BPM | BODY MASS INDEX: 34.46 KG/M2 | TEMPERATURE: 97.5 F | HEIGHT: 72 IN | WEIGHT: 254.4 LBS | OXYGEN SATURATION: 98 % | DIASTOLIC BLOOD PRESSURE: 80 MMHG | SYSTOLIC BLOOD PRESSURE: 136 MMHG

## 2024-10-24 DIAGNOSIS — Z00.00 ANNUAL PHYSICAL EXAM: Primary | ICD-10-CM

## 2024-10-24 DIAGNOSIS — K13.79 LUMP IN MOUTH: ICD-10-CM

## 2024-10-24 DIAGNOSIS — F32.1 CURRENT MODERATE EPISODE OF MAJOR DEPRESSIVE DISORDER WITHOUT PRIOR EPISODE (HCC): ICD-10-CM

## 2024-10-24 PROCEDURE — 99395 PREV VISIT EST AGE 18-39: CPT | Performed by: NURSE PRACTITIONER

## 2024-10-24 NOTE — PROGRESS NOTES
Adult Annual Physical  Name: Newton Mason      : 1990      MRN: [id number]  Encounter Provider: KLARISSA Okeefe  Encounter Date: 10/24/2024   Encounter department: [facility] [address]     Assessment & Plan  Annual physical exam         Current moderate episode of major depressive disorder without prior episode (HCC)    PHQ-9=23  He would like to wean from current regimen.   I advised he continue with current regimen for now.   He can f/u with Dr. Escobedo to discuss weaning vs other medications while waiting to get into another psychiatrist.   He has no active SI today. He will go to ER should they occur.   I did advise he bring meds and doses to his next appointment.     Depression Screening Follow-up Plan: Patient's depression screening was positive with a PHQ-9 score of 23. Patient with underlying depression and was advised to continue current medications as prescribed. Patient advised to follow-up with PCP for further management.         Lump in mouth  Unable to visualize nor palpate reported lump.   I did advise he schedule with dentist for further eval.          Immunizations and preventive care screenings were discussed with patient today. Appropriate education was printed on patient's after visit summary.    Counseling:  Alcohol/drug use: discussed moderation in alcohol intake, the recommendations for healthy alcohol use, and avoidance of illicit drug use.  Dental Health: discussed importance of regular tooth brushing, flossing, and dental visits.  Injury prevention: discussed safety/seat belts, safety helmets, smoke detectors, carbon monoxide detectors, and smoking near bedding or upholstery.  Sexual health: discussed sexually transmitted diseases, partner selection, use of condoms, avoidance of unintended pregnancy, and contraceptive alternatives.  Exercise: the importance of regular exercise/physical activity was discussed. Recommend exercise 3-5 times per week for at  least 30 minutes.       Depression Screening and Follow-up Plan: Patient's depression screening was positive with a PHQ-9 score of 23. Patient with underlying depression and was advised to continue current medications as prescribed. Patient advised to follow-up with PCP for further management.         History of Present Illness     Adult Annual Physical:  Patient presents for annual physical. Has bump on gumline right side of mouth. Painless unless you press on it. He has not brushed his teeth in 1 month. He has not seen a dentist in 9 months although he is working on this.   Sees psychiatry. Reports noncompliance with meds. He has an end goal of trying to come off them. Wants to find a new psychiatrist. His is retired. He has a list. Recently he was so depressed he didn't get out of bed and stopped his meds. Restarted his meds but is having side effects. Psychiatrist did not care about the AE. He states he does not have Bipolar although psychiatrist told him has characteristics. He does have thoughts of being better off dead but has no plan and no intention. He is on Wellbutrin, prazosin and naltrexone to not drink alcohol. He is not sure of doses.   .     Diet and Physical Activity:  - Diet/Nutrition: poor diet.  - Exercise: no formal exercise.    Depression Screening:    - PHQ-9 Score: 23    General Health:  - Sleep: sleeps poorly. due to pain and also difficulty falling asleep.  - Hearing: normal hearing bilateral ears.  - Vision: no vision problems.  - Dental: no dental visits for > 1 year and does not brush teeth regularly. Reports he has not brushed his teeth in a month due to depression.     Health:  - History of STDs: no.   - Urinary symptoms: none.     Review of Systems   Constitutional:  Positive for activity change, appetite change and fatigue. Negative for chills, diaphoresis, fever and unexpected weight change.   HENT: Negative.     Eyes: Negative.    Respiratory: Negative.     Cardiovascular:  Negative.  Negative for chest pain.   Gastrointestinal: Negative.    Endocrine: Negative.    Genitourinary: Negative.    Musculoskeletal: Negative.    Skin: Negative.    Neurological: Negative.    Hematological: Negative.    Psychiatric/Behavioral:  Positive for agitation, dysphoric mood, sleep disturbance and suicidal ideas. Negative for behavioral problems, confusion, decreased concentration, hallucinations and self-injury. The patient is nervous/anxious and is hyperactive.          Objective     /80   Pulse 94   Temp 97.5 °F (36.4 °C) (Tympanic)   Ht 6' (1.829 m)   Wt 115 kg (254 lb 6.4 oz)   SpO2 98%   BMI 34.50 kg/m²     Physical Exam  Vitals reviewed.   Constitutional:       Appearance: Normal appearance. He is obese.   HENT:      Head: Normocephalic and atraumatic.      Right Ear: Tympanic membrane, ear canal and external ear normal.      Left Ear: Tympanic membrane, ear canal and external ear normal.      Nose: Nose normal.      Mouth/Throat:      Mouth: Mucous membranes are moist.      Pharynx: Oropharynx is clear.      Comments: Unable to visualize nor palpate the lump he is reporting.   Eyes:      Conjunctiva/sclera: Conjunctivae normal.      Pupils: Pupils are equal, round, and reactive to light.   Neck:      Thyroid: No thyromegaly.   Cardiovascular:      Rate and Rhythm: Normal rate and regular rhythm.      Heart sounds: Normal heart sounds. No murmur heard.  Pulmonary:      Effort: Pulmonary effort is normal.      Breath sounds: Normal breath sounds.   Abdominal:      General: Abdomen is flat. Bowel sounds are normal.      Palpations: Abdomen is soft. There is no hepatomegaly or splenomegaly.      Tenderness: There is no abdominal tenderness.   Musculoskeletal:         General: Normal range of motion.      Cervical back: Normal range of motion and neck supple.   Skin:     General: Skin is warm and dry.      Capillary Refill: Capillary refill takes less than 2 seconds.   Neurological:       General: No focal deficit present.      Mental Status: He is alert and oriented to person, place, and time.   Psychiatric:         Mood and Affect: Mood normal.         Behavior: Behavior normal.         Thought Content: Thought content normal.         Judgment: Judgment normal.

## 2024-10-24 NOTE — ASSESSMENT & PLAN NOTE
PHQ-9=23  He would like to wean from current regimen.   I advised he continue with current regimen for now.   He can f/u with Dr. Escobedo to discuss weaning vs other medications while waiting to get into another psychiatrist.   He has no active SI today. He will go to ER should they occur.   I did advise he bring meds and doses to his next appointment.     Depression Screening Follow-up Plan: Patient's depression screening was positive with a PHQ-9 score of 23. Patient with underlying depression and was advised to continue current medications as prescribed. Patient advised to follow-up with PCP for further management.

## 2024-12-12 ENCOUNTER — TELEMEDICINE (OUTPATIENT)
Dept: FAMILY MEDICINE CLINIC | Facility: HOSPITAL | Age: 34
End: 2024-12-12
Payer: OTHER GOVERNMENT

## 2024-12-12 DIAGNOSIS — U07.1 COVID-19: Primary | ICD-10-CM

## 2024-12-12 PROCEDURE — 99213 OFFICE O/P EST LOW 20 MIN: CPT | Performed by: NURSE PRACTITIONER

## 2024-12-12 RX ORDER — GABAPENTIN 600 MG/1
600 TABLET ORAL
COMMUNITY
Start: 2024-10-29

## 2024-12-12 NOTE — PROGRESS NOTES
COVID-19 Outpatient Progress Note  Name: Newton Mason      : 1990      MRN: [id number]  Encounter Provider: KLARISSA Gallagher  Encounter Date: 2024   Encounter department: [facility] [address]     Assessment & Plan  COVID-19  Pt low risk w/mild symptoms currently  Discussed option to treat with paxlovid but he has not [facility] at [facility] and results not available currently  Advise he continue supportive measures w/OTC meds & supplements, adequate rest & fluids, and healthy diet as tolerated       Disposition:     Discussed symptom directed medication options with patient. Discussed vitamin D, vitamin C, and/or zinc supplementation with patient.     I have spent a total time of 10 minutes on the day of the encounter for this patient including risks and benefits of treatment options, instructions for management, patient and family education, risk factor reductions, impressions, documenting in the medical record and obtaining or reviewing history.          Encounter provider: KLARISSA Gallagher     Provider located at: [facility] [address]   [address] [place] [address] [address]  [facility] [id number]     Recent Visits  No visits were found meeting these conditions.  Showing recent visits within past 7 days and meeting all other requirements  Today's Visits  Date Type Provider Dept   24 Telemedicine KLARISSA Gallagher [facility] [address]   Showing today's visits and meeting all other requirements  Future Appointments  No visits were found meeting these conditions.  Showing future appointments within next 150 days and meeting all other requirements    History of Present Illness      This virtual check-in was done via [employer] and patient was informed that this is a secure, HIPAA-compliant platform. He agrees to proceed.    Patient agrees to participate in a virtual check in via telephone or video visit instead  of presenting to the office to address urgent/immediate medical needs. Patient is aware this is a billable service. He acknowledged consent and understanding of privacy and security of the video platform. The patient has agreed to participate and understands they can discontinue the visit at any time.    After connecting through [employer], the patient was identified by name and date of birth. Newton Mason was informed that this was a telemedicine visit and that the exam was being conducted confidentially over secure lines. My office door was closed. No one else was in the room. Newton Mason acknowledged consent and understanding of privacy and security of the telemedicine visit. I informed the patient that I have reviewed his record in Epic and presented the opportunity for him to ask any questions regarding the visit today. The patient agreed to participate.     Verification of patient location:  Patient is located in the following state in which I hold an active license: [place]    Subjective:   Newton Mason is a 34 y.o. male who has been screened for COVID-19. Symptom change since last report: unchanged. Patient's symptoms include nasal congestion, sore throat and myalgias. Patient denies fever and chills.     - Date of symptom onset: 12/11/2024  - Date of positive COVID-19 test: 12/12/2024. Type of test: Home antigen. Patient with typical symptoms of COVID-19 and they attest that they were positive on home rapid antigen testing. Image of positive result is not able to be uploaded into their chart.     COVID-19 vaccination status: Fully vaccinated (primary series)    Hx of covid previously and had same symptoms. Recovered without treatment.   Non smoker. Denies known lung disease.   Denies recent covid booster.    Lab Results   Component Value Date    SARSCOV2 DETECTED (A) 10/23/2021       Review of Systems   Constitutional:  Negative for chills and fever.   HENT:  Positive for congestion and sore throat.     Respiratory: Negative.     Cardiovascular: Negative.    Musculoskeletal:  Positive for myalgias and neck pain.     Objective   There were no vitals taken for this visit.    Physical Exam  Constitutional:       General: He is not in acute distress.     Appearance: Normal appearance.   HENT:      Head: Normocephalic.   Eyes:      General: No scleral icterus.        Right eye: No discharge.         Left eye: No discharge.   Pulmonary:      Effort: Pulmonary effort is normal. No respiratory distress.      Comments: No cough  Musculoskeletal:      Cervical back: Normal range of motion. No rigidity.   Neurological:      General: No focal deficit present.      Mental Status: He is alert and oriented to person, place, and time.   Psychiatric:         Mood and Affect: Mood normal.         Behavior: Behavior normal.

## 2025-05-02 ENCOUNTER — OFFICE VISIT (OUTPATIENT)
Dept: FAMILY MEDICINE CLINIC | Facility: HOSPITAL | Age: 35
End: 2025-05-02
Payer: OTHER GOVERNMENT

## 2025-05-02 VITALS
DIASTOLIC BLOOD PRESSURE: 70 MMHG | BODY MASS INDEX: 32.83 KG/M2 | WEIGHT: 242.4 LBS | SYSTOLIC BLOOD PRESSURE: 140 MMHG | HEIGHT: 72 IN | OXYGEN SATURATION: 99 % | HEART RATE: 85 BPM

## 2025-05-02 DIAGNOSIS — F32.1 CURRENT MODERATE EPISODE OF MAJOR DEPRESSIVE DISORDER WITHOUT PRIOR EPISODE (HCC): ICD-10-CM

## 2025-05-02 DIAGNOSIS — M54.41 CHRONIC BILATERAL LOW BACK PAIN WITH SCIATICA, SCIATICA LATERALITY UNSPECIFIED: ICD-10-CM

## 2025-05-02 DIAGNOSIS — G89.29 CHRONIC BILATERAL LOW BACK PAIN WITH SCIATICA, SCIATICA LATERALITY UNSPECIFIED: ICD-10-CM

## 2025-05-02 DIAGNOSIS — G57.12 MERALGIA PARAESTHETICA, LEFT: Primary | ICD-10-CM

## 2025-05-02 DIAGNOSIS — M54.42 CHRONIC BILATERAL LOW BACK PAIN WITH SCIATICA, SCIATICA LATERALITY UNSPECIFIED: ICD-10-CM

## 2025-05-02 PROCEDURE — 99214 OFFICE O/P EST MOD 30 MIN: CPT | Performed by: FAMILY MEDICINE

## 2025-05-02 RX ORDER — LAMOTRIGINE 100 MG/1
100 TABLET ORAL 2 TIMES DAILY
COMMUNITY

## 2025-05-02 RX ORDER — NALTREXONE HYDROCHLORIDE 50 MG/1
50 TABLET, FILM COATED ORAL DAILY
COMMUNITY

## 2025-05-05 NOTE — PROGRESS NOTES
Name: Newton Mason      : 1990      MRN: [id number]  Encounter Provider: Abelino Chatterjee MD  Encounter Date: 2025   Encounter department: [facility] [address]   :  Assessment & Plan  Meralgia paraesthetica, left    The area of concern for neuropathy is consistent with meralgia paresthetica.   He has been on gabapenin for back pain.   Will increase gabapentin dosing to see if this helps.     He would like to see neurology for further evaluation and referral is placed.   Orders:  •  Ambulatory Referral to Neurology; Future    Current moderate episode of major depressive disorder without prior episode (HCC)  He will continue to fu with psychiatry.   No si/hi.   Currently off of his medications.   Has upcoming fu.          Chronic bilateral low back pain with sciatica, sciatica laterality unspecified  Thoracic and lumbar low back pain.   [facility]                   History of Present Illness   Patient is seen for fu.   He has ongoing numbness in the left thigh area.   Noting it seems to be worsening.   No new trauma, no aggravating event.   Currently on gabapentin for his back pain     Known mdd and throacic and lumbar pain.   [facility].       Review of Systems   Constitutional: Negative.  Negative for activity change, appetite change, chills and diaphoresis.   HENT:  Negative for congestion and dental problem.    Respiratory: Negative.  Negative for apnea, chest tightness, shortness of breath and wheezing.    Cardiovascular: Negative.  Negative for chest pain, palpitations and leg swelling.   Gastrointestinal: Negative.  Negative for abdominal distention, abdominal pain, constipation, diarrhea and nausea.   Genitourinary: Negative.  Negative for difficulty urinating, dysuria and frequency.       Objective   /70 (BP Location: Left arm, Patient Position: Sitting)   Pulse 85   Ht 6' (1.829 m)   Wt 110 kg (242  lb 6.4 oz)   SpO2 99%   BMI 32.88 kg/m²      Physical Exam  Vitals reviewed.   Constitutional:       Appearance: Normal appearance.   Neurological:      Mental Status: He is alert.   Psychiatric:         Attention and Perception: Attention normal.         Mood and Affect: Mood is depressed.         Behavior: Behavior normal.         Cognition and Memory: Cognition normal.         Judgment: Judgment normal.

## 2025-05-05 NOTE — ASSESSMENT & PLAN NOTE
He will continue to fu with psychiatry.   No si/hi.   Currently off of his medications.   Has upcoming fu.